# Patient Record
Sex: FEMALE | Race: WHITE | NOT HISPANIC OR LATINO | Employment: UNEMPLOYED | URBAN - METROPOLITAN AREA
[De-identification: names, ages, dates, MRNs, and addresses within clinical notes are randomized per-mention and may not be internally consistent; named-entity substitution may affect disease eponyms.]

---

## 2021-12-07 ENCOUNTER — TRANSCRIBE ORDERS (OUTPATIENT)
Dept: ADMINISTRATIVE | Facility: HOSPITAL | Age: 56
End: 2021-12-07

## 2021-12-07 DIAGNOSIS — R91.1 LUNG NODULE: Primary | ICD-10-CM

## 2021-12-08 ENCOUNTER — HOSPITAL ENCOUNTER (OUTPATIENT)
Dept: PET IMAGING | Facility: HOSPITAL | Age: 56
Discharge: HOME | End: 2021-12-08

## 2021-12-08 DIAGNOSIS — R91.1 LUNG NODULE: ICD-10-CM

## 2021-12-08 PROCEDURE — 78815 PET IMAGE W/CT SKULL-THIGH: CPT

## 2021-12-08 PROCEDURE — A9552 F18 FDG: HCPCS | Performed by: NURSE PRACTITIONER

## 2021-12-08 PROCEDURE — 0 FLUDEOXYGLUCOSE F18 SOLUTION: Performed by: NURSE PRACTITIONER

## 2021-12-08 RX ADMIN — FLUDEOXYGLUCOSE F18 1 DOSE: 300 INJECTION INTRAVENOUS at 09:45

## 2022-01-21 ENCOUNTER — TRANSCRIBE ORDERS (OUTPATIENT)
Dept: ADMINISTRATIVE | Facility: HOSPITAL | Age: 57
End: 2022-01-21

## 2022-01-21 DIAGNOSIS — E04.1 THYROID NODULE: Primary | ICD-10-CM

## 2022-02-17 ENCOUNTER — HOSPITAL ENCOUNTER (OUTPATIENT)
Dept: ULTRASOUND IMAGING | Facility: HOSPITAL | Age: 57
Discharge: HOME | End: 2022-02-17
Admitting: INTERNAL MEDICINE

## 2022-02-17 DIAGNOSIS — E04.1 THYROID NODULE: ICD-10-CM

## 2022-02-17 PROCEDURE — 76536 US EXAM OF HEAD AND NECK: CPT

## 2022-06-22 ENCOUNTER — OFFICE VISIT (OUTPATIENT)
Dept: ORTHOPEDIC SURGERY | Facility: CLINIC | Age: 57
End: 2022-06-22

## 2022-06-22 VITALS — HEIGHT: 62 IN | OXYGEN SATURATION: 97 % | WEIGHT: 186 LBS | HEART RATE: 74 BPM | BODY MASS INDEX: 34.23 KG/M2

## 2022-06-22 DIAGNOSIS — M25.562 LEFT KNEE PAIN, UNSPECIFIED CHRONICITY: Primary | ICD-10-CM

## 2022-06-22 DIAGNOSIS — M17.12 PRIMARY OSTEOARTHRITIS OF LEFT KNEE: ICD-10-CM

## 2022-06-22 PROCEDURE — 99203 OFFICE O/P NEW LOW 30 MIN: CPT | Performed by: ORTHOPAEDIC SURGERY

## 2022-06-22 PROCEDURE — 20610 DRAIN/INJ JOINT/BURSA W/O US: CPT | Performed by: ORTHOPAEDIC SURGERY

## 2022-06-22 RX ORDER — ATORVASTATIN CALCIUM 10 MG/1
10 TABLET, FILM COATED ORAL DAILY
COMMUNITY
Start: 2022-06-17

## 2022-06-22 RX ORDER — MONTELUKAST SODIUM 10 MG/1
10 TABLET ORAL DAILY
COMMUNITY
Start: 2022-05-12

## 2022-06-22 RX ORDER — CELECOXIB 100 MG/1
CAPSULE ORAL
COMMUNITY
Start: 2022-06-17

## 2022-06-22 RX ORDER — CETIRIZINE HYDROCHLORIDE 10 MG/1
TABLET ORAL
COMMUNITY
Start: 2022-05-21

## 2022-06-22 RX ORDER — LEVOTHYROXINE SODIUM 125 MCG
TABLET ORAL
COMMUNITY
Start: 2022-06-18

## 2022-06-22 RX ORDER — LISINOPRIL 10 MG/1
10 TABLET ORAL DAILY
COMMUNITY
Start: 2022-05-21

## 2022-06-22 RX ORDER — TRIAMCINOLONE ACETONIDE 40 MG/ML
40 INJECTION, SUSPENSION INTRA-ARTICULAR; INTRAMUSCULAR
Status: COMPLETED | OUTPATIENT
Start: 2022-06-22 | End: 2022-06-22

## 2022-06-22 RX ORDER — LIDOCAINE HYDROCHLORIDE 10 MG/ML
9 INJECTION, SOLUTION INFILTRATION; PERINEURAL
Status: COMPLETED | OUTPATIENT
Start: 2022-06-22 | End: 2022-06-22

## 2022-06-22 RX ORDER — MELATONIN 10 MG
CAPSULE ORAL
COMMUNITY

## 2022-06-22 RX ADMIN — LIDOCAINE HYDROCHLORIDE 9 ML: 10 INJECTION, SOLUTION INFILTRATION; PERINEURAL at 08:09

## 2022-06-22 RX ADMIN — TRIAMCINOLONE ACETONIDE 40 MG: 40 INJECTION, SUSPENSION INTRA-ARTICULAR; INTRAMUSCULAR at 08:09

## 2022-06-22 NOTE — PROGRESS NOTES
"Chief Complaint  Initial Evaluation of the Left Knee     Subjective      Dimple Castellanos presents to [facility] for an evaluation of left knee. She has had on and off pain in the left knee for several years, last month pain worsened. She hasn't had any injury or trauma to the knee. No formal treatment for the knee has been done. She is present today with MRI results of the left knee.     Allergies   Allergen Reactions   • Milk-Related Compounds Unknown - Low Severity   • Sulfa Antibiotics Unknown - Low Severity        Social History     Socioeconomic History   • Marital status: Single   Tobacco Use   • Smoking status: Never Smoker   • Smokeless tobacco: Never Used        Review of Systems     Objective   Vital Signs:   Pulse 74   Ht 157.5 cm (62\")   Wt 84.4 kg (186 lb)   SpO2 97%   BMI 34.02 kg/m²       Physical Exam  Constitutional:       Appearance: Normal appearance. Patient is well-developed and normal weight.   HENT:      Head: Normocephalic.      Right Ear: Hearing and external ear normal.      Left Ear: Hearing and external ear normal.      Nose: Nose normal.   Eyes:      Conjunctiva/sclera: Conjunctivae normal.   Cardiovascular:      Rate and Rhythm: Normal rate.   Pulmonary:      Effort: Pulmonary effort is normal.      Breath sounds: No wheezing or rales.   Abdominal:      Palpations: Abdomen is soft.      Tenderness: There is no abdominal tenderness.   Musculoskeletal:      Cervical back: Normal range of motion.   Skin:     Findings: No rash.   Neurological:      Mental Status: Patient  is alert and oriented to person, place, and time.   Psychiatric:         Mood and Affect: Mood and affect normal.         Judgment: Judgment normal.       Ortho Exam      LEFT KNEE: -5 degrees of extension. Flexion to 120 degrees. Limping gait. Good strength to hamstrings, quadriceps, dorsiflexors and plantar flexors. Stable to varus/valgus stress. Stable anterior and posterior drawer. " Swelling. Tender medial and lateral joint lines.     Large Joint Arthrocentesis: L knee  Date/Time: 6/22/2022 8:09 AM  Consent given by: patient  Site marked: site marked  Timeout: Immediately prior to procedure a time out was called to verify the correct patient, procedure, equipment, support staff and site/side marked as required   Supporting Documentation  Indications: pain   Procedure Details  Location: knee - L knee  Needle size: 22 G  Medications administered: 9 mL lidocaine 1 %; 40 mg triamcinolone acetonide 40 MG/ML  Patient tolerance: patient tolerated the procedure well with no immediate complications              Imaging Results (Most Recent)     Procedure Component Value Units Date/Time    XR Knee 3 View Left [[id number]] Resulted: 06/22/22 0745     Updated: 06/22/22 0747           Result Review :     X-Ray Report:  Left knee(s) X-Ray  Indication: Evaluation of left knee pain   AP, Lateral and Standing view(s)  Findings: Joint space narrowing, spurring present. No acute fractures or dislocation.   Prior studies available for comparison: no   ___    [facility]      5/25/22     MRI LEFT KNEE     IMPRESSION: 1. Moderately advanced medial compartment arthrosis, medial meniscus tear detailed above.     2. 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      3. Mild effusion.      4. Mucoid degeneration ACL.     5. Lateral compartment chondromalacia detailed above.      5. No acute fractures or sizable loose body.        Assessment and Plan     Diagnoses and all orders for this visit:    1. Left knee pain, unspecified chronicity (Primary)  -     XR Knee 3 View Left    2. Primary osteoarthritis of left knee        Treatment plans and diagnosis with the patient discussed. Risks and benefits of steroid injection discussed, she understands and wishes to proceed. Left knee injection tolerated well. If this fails, may consider gel injections. Continue taking  Celebrex.     Call or return if worsening symptoms.    Follow Up     4-6 weeks.       Patient was given instructions and counseling regarding her condition or for health maintenance advice. Please see specific information pulled into the AVS if appropriate.     Scribed for Pawel Zavala MD by Shruti Mcgill.  06/22/22   08:00 EDT    I have personally performed the services described in this document as scribed by the above individual and it is both accurate and complete. Pawel Zavala MD 06/22/22

## 2022-09-06 ENCOUNTER — TELEPHONE (OUTPATIENT)
Dept: ORTHOPEDIC SURGERY | Facility: CLINIC | Age: 57
End: 2022-09-06

## 2022-09-06 NOTE — TELEPHONE ENCOUNTER
Caller: Dimple Castellanos    Relationship to patient: Self    Best call back number: [phone]    Chief complaint: LEFT KNEE    Type of visit: INJECTION    Requested date: BEGINNING OF October     Additional notes: PATIENT WOULD LIKE TO SCHEDULE CORTISONE INJECTION LEFT KNEE IN BEGINNING OF October. TY

## 2022-09-07 ENCOUNTER — TELEPHONE (OUTPATIENT)
Dept: ORTHOPEDIC SURGERY | Facility: CLINIC | Age: 57
End: 2022-09-07

## 2022-09-07 NOTE — TELEPHONE ENCOUNTER
Caller: MARY ANN GRACIA    Relationship to patient: SELF    Patient is needing:   SALVADOR: RE: THE SCHEDULED INJ 09/07/2022 PATIENT CALLED BACK AFTER SCHEDULING THE INJECTION AND ADVISES NEEDS A Friday.  ATTEMPTED TO WARM TRANSFER - HUB ADVISED PATIENT WOULD NOTIFY THE FRONT AND SOMEONE WOULD HAVE TO CALL HER BACK, TO SCHEDULE

## 2022-09-14 NOTE — TELEPHONE ENCOUNTER
PATIENT CALLED BACK, SAID SHE NEEDS TO RESCHEDULE FOR TRANSPORTATION ISSUES. SHE WOULD LIKE TO HAVE THE APPT WITH DR VALDERRAMA IF POSSIBLE.

## 2022-10-21 ENCOUNTER — OFFICE VISIT (OUTPATIENT)
Dept: ORTHOPEDIC SURGERY | Facility: CLINIC | Age: 57
End: 2022-10-21

## 2022-10-21 VITALS — HEART RATE: 74 BPM | BODY MASS INDEX: 34.78 KG/M2 | WEIGHT: 189 LBS | HEIGHT: 62 IN | OXYGEN SATURATION: 97 %

## 2022-10-21 DIAGNOSIS — M17.12 PRIMARY OSTEOARTHRITIS OF LEFT KNEE: Primary | ICD-10-CM

## 2022-10-21 PROCEDURE — 20610 DRAIN/INJ JOINT/BURSA W/O US: CPT | Performed by: ORTHOPAEDIC SURGERY

## 2022-10-21 RX ORDER — TRIAMCINOLONE ACETONIDE 40 MG/ML
40 INJECTION, SUSPENSION INTRA-ARTICULAR; INTRAMUSCULAR
Status: COMPLETED | OUTPATIENT
Start: 2022-10-21 | End: 2022-10-21

## 2022-10-21 RX ORDER — QUETIAPINE FUMARATE 50 MG/1
50 TABLET, FILM COATED ORAL
COMMUNITY
Start: 2022-09-27

## 2022-10-21 RX ORDER — CARIPRAZINE 3 MG/1
3 CAPSULE, GELATIN COATED ORAL DAILY
COMMUNITY
Start: 2022-09-22

## 2022-10-21 RX ORDER — LIDOCAINE HYDROCHLORIDE 10 MG/ML
5 INJECTION, SOLUTION INFILTRATION; PERINEURAL
Status: COMPLETED | OUTPATIENT
Start: 2022-10-21 | End: 2022-10-21

## 2022-10-21 RX ORDER — PRENATAL VIT 91/IRON/FOLIC/DHA 28-975-200
COMBINATION PACKAGE (EA) ORAL
COMMUNITY
Start: 2022-10-18

## 2022-10-21 RX ADMIN — LIDOCAINE HYDROCHLORIDE 5 ML: 10 INJECTION, SOLUTION INFILTRATION; PERINEURAL at 13:14

## 2022-10-21 RX ADMIN — TRIAMCINOLONE ACETONIDE 40 MG: 40 INJECTION, SUSPENSION INTRA-ARTICULAR; INTRAMUSCULAR at 13:14

## 2022-10-21 NOTE — PROGRESS NOTES
"Chief Complaint  Follow-up of the Left Knee     Subjective      Dimple Castellanos presents to [facility] for a follow-up of left knee. Patient has a history of left knee osteoarthritis. Patient states that she has been having increasing pain in the left knee. She denies any new injuries or trauma. She had injections in the past that gave her relief.     Allergies   Allergen Reactions   • Milk-Related Compounds Unknown - Low Severity   • Sulfa Antibiotics Unknown - Low Severity        Social History     Socioeconomic History   • Marital status: Single   Tobacco Use   • Smoking status: Never   • Smokeless tobacco: Never        Review of Systems     Objective   Vital Signs:   Pulse 74   Ht 157.5 cm (62\")   Wt 85.7 kg (189 lb)   SpO2 97%   BMI 34.57 kg/m²       Physical Exam  Constitutional:       Appearance: Normal appearance. Patient is well-developed and normal weight.   HENT:      Head: Normocephalic.      Right Ear: Hearing and external ear normal.      Left Ear: Hearing and external ear normal.      Nose: Nose normal.   Eyes:      Conjunctiva/sclera: Conjunctivae normal.   Cardiovascular:      Rate and Rhythm: Normal rate.   Pulmonary:      Effort: Pulmonary effort is normal.      Breath sounds: No wheezing or rales.   Abdominal:      Palpations: Abdomen is soft.      Tenderness: There is no abdominal tenderness.   Musculoskeletal:      Cervical back: Normal range of motion.   Skin:     Findings: No rash.   Neurological:      Mental Status: Patient  is alert and oriented to person, place, and time.   Psychiatric:         Mood and Affect: Mood and affect normal.         Judgment: Judgment normal.       Ortho Exam      LEFT KNEE: Good strength to hamstrings, quadriceps, dorsiflexors and plantar flexors. Stable to varus/valgus stress. Stable anterior and posterior drawer. Negative Lachman. Dorsal Pedal Pulse 2+, posterior tibialis pulse 2+. -5 degrees of extension. Flexion to 120 " degrees. Limping gait. Tender joint lines.       Large Joint Arthrocentesis: L knee  Date/Time: 10/21/2022 1:14 PM  Consent given by: patient  Site marked: site marked  Timeout: Immediately prior to procedure a time out was called to verify the correct patient, procedure, equipment, support staff and site/side marked as required   Supporting Documentation  Indications: pain   Procedure Details  Location: knee - L knee  Needle size: 22 G  Medications administered: 5 mL lidocaine 1 %; 40 mg triamcinolone acetonide 40 MG/ML  Patient tolerance: patient tolerated the procedure well with no immediate complications              Imaging Results (Most Recent)     None           Result Review :         No results found.           Assessment and Plan     Diagnoses and all orders for this visit:    1. Primary osteoarthritis of left knee (Primary)        Left knee cortisone injection administered, patient tolerated this well.     Call or return if worsening symptoms.    Follow Up     Prn.       Patient was given instructions and counseling regarding her condition or for health maintenance advice. Please see specific information pulled into the AVS if appropriate.     Scribed for Pawel Zavala MD by Shruti Mcgill.  10/21/22   13:09 EDT    I have personally performed the services described in this document as scribed by the above individual and it is both accurate and complete. Pawel Zavala MD 10/21/22

## 2023-03-31 ENCOUNTER — TELEPHONE (OUTPATIENT)
Dept: OBSTETRICS AND GYNECOLOGY | Facility: CLINIC | Age: 58
End: 2023-03-31

## 2023-05-30 ENCOUNTER — TELEPHONE (OUTPATIENT)
Dept: ORTHOPEDIC SURGERY | Facility: CLINIC | Age: 58
End: 2023-05-30

## 2023-05-30 NOTE — TELEPHONE ENCOUNTER
Caller: MARY ANN    Relationship to patient: SELF    Best call back number: [phone]    Chief complaint: LEFT KNEE PAIN     Type of visit: INJECTION     Requested date: NEXT OPENING

## 2023-05-31 ENCOUNTER — TELEPHONE (OUTPATIENT)
Dept: ORTHOPEDIC SURGERY | Facility: CLINIC | Age: 58
End: 2023-05-31

## 2023-05-31 NOTE — TELEPHONE ENCOUNTER
Caller: MARY ANN GRACIA    Relationship to patient: SELF    Best call back number: [phone]    Chief complaint: LEFT KNEE INJECTION    Type of visit: LEFT KNEE INJECTION    Requested date: THE FOLLOWING WEEK    If rescheduling, when is the original appointment: 06/07/2023    Additional notes: NA

## 2023-06-06 ENCOUNTER — OFFICE VISIT (OUTPATIENT)
Dept: OBSTETRICS AND GYNECOLOGY | Facility: CLINIC | Age: 58
End: 2023-06-06
Payer: COMMERCIAL

## 2023-06-06 VITALS
HEIGHT: 62 IN | HEART RATE: 65 BPM | DIASTOLIC BLOOD PRESSURE: 83 MMHG | BODY MASS INDEX: 36.25 KG/M2 | SYSTOLIC BLOOD PRESSURE: 136 MMHG | WEIGHT: 197 LBS

## 2023-06-06 DIAGNOSIS — N81.4 CYSTOCELE WITH PROLAPSE: Primary | ICD-10-CM

## 2023-06-06 PROCEDURE — 99203 OFFICE O/P NEW LOW 30 MIN: CPT | Performed by: OBSTETRICS & GYNECOLOGY

## 2023-06-06 RX ORDER — TERBINAFINE HYDROCHLORIDE 250 MG/1
250 TABLET ORAL EVERY MORNING
COMMUNITY
Start: 2023-05-25

## 2023-06-06 RX ORDER — IPRATROPIUM BROMIDE AND ALBUTEROL SULFATE 2.5; .5 MG/3ML; MG/3ML
SOLUTION RESPIRATORY (INHALATION)
COMMUNITY
Start: 2023-05-08

## 2023-06-06 RX ORDER — BUDESONIDE AND FORMOTEROL FUMARATE DIHYDRATE 160; 4.5 UG/1; UG/1
2 AEROSOL RESPIRATORY (INHALATION)
COMMUNITY

## 2023-06-06 RX ORDER — POLYETHYLENE GLYCOL 3350 17 G/17G
POWDER, FOR SOLUTION ORAL
COMMUNITY
Start: 2023-05-12

## 2023-06-06 RX ORDER — LEVOTHYROXINE SODIUM 112 MCG
112 TABLET ORAL
COMMUNITY

## 2023-06-06 RX ORDER — MELATONIN
1000
COMMUNITY

## 2023-06-06 RX ORDER — MULTIVITAMIN
1 TABLET ORAL EVERY MORNING
COMMUNITY
Start: 2023-05-25

## 2023-06-06 NOTE — PROGRESS NOTES
"GYN Visit    Chief Complaint   Patient presents with    Establish Care     Possible bladder prolapse       HPI:   57 y.o. LMP: No LMP recorded. Patient is postmenopausal. Here today for referral for bladder prolapse. Patient states she has started to notice increase pressure in her pelvis like something is trying to fall out. She also has been having some incontinence issue where she will sometimes just stand up and urinate without warning. She denies any incontinence with coughing, laughing or sneezing. She denies any bowel issues or need to split. She denies any vaginal odor, discharge or irritation. She is not sexually active. On examination she was noted to have a grade 2 without valsalva and a grade 3 with valsalva, negative coughing test. She was counseled on options inducing lifestyle modifications verses pessary verses surgical. At this time she is wishing to try lifestyle and a possible pessary. She was counseled that we will bring her in for a pessary fitting. She denies any other complaints today, she denies any dysuria or hematuria.     Social History     Substance and Sexual Activity   Sexual Activity Not Currently    Birth control/protection: Post-menopausal       History: PMHx, Meds, Allergies, PSHx, Social Hx, and POBHx all reviewed and updated.    PHYSICAL EXAM:  /83   Pulse 65   Ht 157.5 cm (62\")   Wt 89.4 kg (197 lb)   BMI 36.03 kg/m²   General- NAD, alert and oriented, appropriate  Psych- Normal mood, good memory    Neck- No masses, no thyroid enlargement  Cardiovascular- Regular rhythm, no murnurs  Respiratory- CTA to bases, no wheezes  Abdomen- Soft, non distended, non tender, no masses    Breast left-  Bilaterally symmetrical, no masses, non tender, no nipple discharge  Breast right- Bilaterally symmetrical, no masses, non tender, no nipple discharge    External genitalia- Normal female, no lesions  Urethra/meatus- Normal, no masses, non tender, no prolapse  Bladder- Normal, " no masses, non tender, grade 2 cystocele w/o valsalva, grade  with valsalva.  Vagina- Normal, no atrophy, no lesions, no discharge, no prolapse  Cervix- Normal, no lesions, no discharge, No cervical motion tenderness  Uterus- Normal size, shape & consistency.  Non tender, mobile, & no prolapse  Adnexa- No mass, non tender  Anus/Rectum/Perineum- Declined        ASSESSMENT AND PLAN:  Diagnoses and all orders for this visit:    1. Cystocele with prolapse (Primary)        Follow Up:  Return in about 4 weeks (around 7/4/2023) for pessary fitiing .    I spent 20 minutes caring for Dimple on this date of service. This time includes time spent by me in the following activities:preparing for the visit, reviewing tests, obtaining and/or reviewing a separately obtained history, performing a medically appropriate examination and/or evaluation , and counseling and educating the patient/family/caregiver    Kati Gallardo DO  06/06/2023    [facility]  [facility] [place] [address]  Dept: [phone]  Dept Fax: [phone]  Loc: [phone]  Loc Fax: [phone]

## 2023-06-07 ENCOUNTER — OFFICE VISIT (OUTPATIENT)
Dept: ORTHOPEDIC SURGERY | Facility: CLINIC | Age: 58
End: 2023-06-07
Payer: COMMERCIAL

## 2023-06-07 VITALS
DIASTOLIC BLOOD PRESSURE: 84 MMHG | OXYGEN SATURATION: 96 % | HEIGHT: 62 IN | WEIGHT: 201.4 LBS | BODY MASS INDEX: 37.06 KG/M2 | HEART RATE: 59 BPM | SYSTOLIC BLOOD PRESSURE: 159 MMHG

## 2023-06-07 DIAGNOSIS — M25.562 LEFT KNEE PAIN, UNSPECIFIED CHRONICITY: Primary | ICD-10-CM

## 2023-06-07 DIAGNOSIS — M17.12 PRIMARY OSTEOARTHRITIS OF LEFT KNEE: ICD-10-CM

## 2023-06-07 RX ORDER — TRIAMCINOLONE ACETONIDE 40 MG/ML
40 INJECTION, SUSPENSION INTRA-ARTICULAR; INTRAMUSCULAR
Status: COMPLETED | OUTPATIENT
Start: 2023-06-07 | End: 2023-06-07

## 2023-06-07 RX ORDER — LIDOCAINE HYDROCHLORIDE 10 MG/ML
5 INJECTION, SOLUTION INFILTRATION; PERINEURAL
Status: COMPLETED | OUTPATIENT
Start: 2023-06-07 | End: 2023-06-07

## 2023-06-07 RX ADMIN — LIDOCAINE HYDROCHLORIDE 5 ML: 10 INJECTION, SOLUTION INFILTRATION; PERINEURAL at 10:31

## 2023-06-07 RX ADMIN — TRIAMCINOLONE ACETONIDE 40 MG: 40 INJECTION, SUSPENSION INTRA-ARTICULAR; INTRAMUSCULAR at 10:31

## 2023-06-07 NOTE — PROGRESS NOTES
"Chief Complaint  Follow-up of the Left Knee     Subjective      Dimple Castellanos presents to [facility] for follow up of the left knee. She has had osteo arthritis and treated it conservatively for years.  She gets injections to help with pain.  She has difficulty with prolonged standing and ambulation.  She has had no recent injury.     Allergies   Allergen Reactions    Beef Allergy GI Intolerance    Milk-Related Compounds Unknown - Low Severity    Pork-Derived Products GI Intolerance    Sulfa Antibiotics Unknown - Low Severity        Social History     Socioeconomic History    Marital status: Single   Tobacco Use    Smoking status: Never    Smokeless tobacco: Never   Vaping Use    Vaping Use: Never used   Substance and Sexual Activity    Alcohol use: Never    Drug use: Never    Sexual activity: Not Currently     Birth control/protection: Post-menopausal        Review of Systems     Objective   Vital Signs:   /84 (BP Location: Left arm, Patient Position: Sitting, Cuff Size: Adult)   Pulse 59   Ht 157.5 cm (62\")   Wt 91.4 kg (201 lb 6.4 oz)   SpO2 96%   BMI 36.84 kg/m²       Physical Exam  Constitutional:       Appearance: Normal appearance. Patient is well-developed and normal weight.   HENT:      Head: Normocephalic.      Right Ear: Hearing and external ear normal.      Left Ear: Hearing and external ear normal.      Nose: Nose normal.   Eyes:      Conjunctiva/sclera: Conjunctivae normal.   Cardiovascular:      Rate and Rhythm: Normal rate.   Pulmonary:      Effort: Pulmonary effort is normal.      Breath sounds: No wheezing or rales.   Abdominal:      Palpations: Abdomen is soft.      Tenderness: There is no abdominal tenderness.   Musculoskeletal:      Cervical back: Normal range of motion.   Skin:     Findings: No rash.   Neurological:      Mental Status: Patient  is alert and oriented to person, place, and time.   Psychiatric:         Mood and Affect: Mood and affect " normal.         Judgment: Judgment normal.       Ortho Exam      LEFT KNEE Flexion 115. Extension 0. Stable to varus/valgus stress. Stable to anterior/posterior drawer. Neurovascularly intact. Negative Tyra. Negative Lachman. Positive EHL, FHL, HS and TA. Sensation intact to light touch all 5 nerves of the foot. Ambulates with Antalgic gait. Patella is well tracking. Calf supple, non-tender. Positive tenderness to the medial joint line. Positive tenderness to the lateral joint line. Positive Crepitus. Good strength to hamstrings, quadriceps, dorsiflexors, and plantar flexors.  Knee Extensor Mechanism intact      Left knee: L knee  Date/Time: 6/7/2023 10:31 AM  Consent given by: patient  Site marked: site marked  Timeout: Immediately prior to procedure a time out was called to verify the correct patient, procedure, equipment, support staff and site/side marked as required   Supporting Documentation  Indications: pain   Procedure Details  Location: knee - L knee  Needle size: 22 G  Medications administered: 40 mg triamcinolone acetonide 40 MG/ML; 5 mL lidocaine 1 %  Patient tolerance: patient tolerated the procedure well with no immediate complications            Imaging Results (Most Recent)       None             Result Review :               Assessment and Plan     Diagnoses and all orders for this visit:    1. Left knee pain, unspecified chronicity (Primary)  -     Left knee: L knee    2. Primary osteoarthritis of left knee        Discussed the treatment plan with the patient. I reviewed the X-rays that were obtained today with the patient.     Discussed the risks and benefits of conservative measures.  The patient expressed understanding and wished to proceed with a left knee steroid injection.  She tolerated the injection well.     Call or return if worsening symptoms.    Follow Up     PRN      Patient was given instructions and counseling regarding her condition or for health maintenance advice. Please see  specific information pulled into the AVS if appropriate.     Scribed for Pawel Zavala MD by Hayley Machuca MA.  06/07/23   10:28 EDT      I have personally performed the services described in this document as scribed by the above individual and it is both accurate and complete. Pawel Zavala MD 06/07/23

## 2023-08-23 ENCOUNTER — TELEPHONE (OUTPATIENT)
Dept: ORTHOPEDIC SURGERY | Facility: CLINIC | Age: 58
End: 2023-08-23
Payer: COMMERCIAL

## 2023-08-23 NOTE — TELEPHONE ENCOUNTER
Provider: DR VALDERRAMA   Caller: MARY ANN GRACIA   Phone Number: [phone]  Reason for Call: PATIENT WANTS TO GET ANOTHER LEFT KNEE INJECTION SCHEDULED. SHE CAN ONLY DO FRIDAYS. LAST ONE WAS 6/7/23. PLEASE ADVISE.

## 2023-09-08 ENCOUNTER — OFFICE VISIT (OUTPATIENT)
Dept: ORTHOPEDIC SURGERY | Facility: CLINIC | Age: 58
End: 2023-09-08
Payer: COMMERCIAL

## 2023-09-08 VITALS — HEIGHT: 62 IN | BODY MASS INDEX: 35.7 KG/M2 | WEIGHT: 194 LBS

## 2023-09-08 DIAGNOSIS — M17.12 PRIMARY OSTEOARTHRITIS OF LEFT KNEE: Primary | ICD-10-CM

## 2023-09-08 DIAGNOSIS — M25.562 LEFT KNEE PAIN, UNSPECIFIED CHRONICITY: ICD-10-CM

## 2023-09-08 RX ORDER — LIDOCAINE HYDROCHLORIDE 10 MG/ML
5 INJECTION, SOLUTION INFILTRATION; PERINEURAL
Status: COMPLETED | OUTPATIENT
Start: 2023-09-08 | End: 2023-09-08

## 2023-09-08 RX ORDER — TRIAMCINOLONE ACETONIDE 40 MG/ML
40 INJECTION, SUSPENSION INTRA-ARTICULAR; INTRAMUSCULAR
Status: COMPLETED | OUTPATIENT
Start: 2023-09-08 | End: 2023-09-08

## 2023-09-08 RX ADMIN — TRIAMCINOLONE ACETONIDE 40 MG: 40 INJECTION, SUSPENSION INTRA-ARTICULAR; INTRAMUSCULAR at 15:01

## 2023-09-08 RX ADMIN — LIDOCAINE HYDROCHLORIDE 5 ML: 10 INJECTION, SOLUTION INFILTRATION; PERINEURAL at 15:01

## 2023-09-08 NOTE — PROGRESS NOTES
"Chief Complaint  Follow-up of the Left Knee     Subjective      Dimple Castellanos presents to [facility] for follow up of the left knee.  She had an injection 6/7/23.  She noted that was helpful for three months. She has had osteo arthritis and treated it conservatively for years.  She has difficulty with prolonged standing and ambulation.  She has had no recent injury.     Allergies   Allergen Reactions    Beef Allergy GI Intolerance    Milk-Related Compounds Unknown - Low Severity    Pork-Derived Products GI Intolerance    Sulfa Antibiotics Unknown - Low Severity        Social History     Socioeconomic History    Marital status: Single   Tobacco Use    Smoking status: Never    Smokeless tobacco: Never   Vaping Use    Vaping Use: Never used   Substance and Sexual Activity    Alcohol use: Never    Drug use: Never    Sexual activity: Not Currently     Birth control/protection: Post-menopausal        I reviewed the patient's chief complaint, history of present illness, review of systems, past medical history, surgical history, family history, social history, medications, and allergy list.     Review of Systems     Constitutional: Denies fevers, chills, weight loss  Cardiovascular: Denies chest pain, shortness of breath  Skin: Denies rashes, acute skin changes  Neurologic: Denies headache, loss of consciousness        Vital Signs:   Ht 157.5 cm (62\")   Wt 88 kg (194 lb)   BMI 35.48 kg/m²          Physical Exam  General: Alert. No acute distress    Ortho Exam        LEFT KNEE Flexion 120. Extension 0. Stable to varus/valgus stress. Stable to anterior/posterior drawer. Neurovascularly intact. Negative Tyra. Negative Lachman. Positive EHL, FHL, HS and TA. Sensation intact to light touch all 5 nerves of the foot. Ambulates with Antalgic gait. Patella is well tracking. Calf supple, non-tender. Positive tenderness to the medial joint line. Positive tenderness to the lateral joint line. " Positive Crepitus. Good strength to hamstrings, quadriceps, dorsiflexors, and plantar flexors.  Knee Extensor Mechanism intact       Left knee: L knee  Date/Time: 9/8/2023 3:01 PM  Consent given by: patient  Site marked: site marked  Timeout: Immediately prior to procedure a time out was called to verify the correct patient, procedure, equipment, support staff and site/side marked as required   Supporting Documentation  Indications: pain   Procedure Details  Location: knee - L knee  Needle size: 22 G  Medications administered: 5 mL lidocaine 1 %; 40 mg triamcinolone acetonide 40 MG/ML  Patient tolerance: patient tolerated the procedure well with no immediate complications            Imaging Results (Most Recent)       None             Result Review :           Assessment and Plan     Diagnoses and all orders for this visit:    1. Primary osteoarthritis of left knee (Primary)    2. Left knee pain, unspecified chronicity        Discussed the treatment plan with the patient.     Discussed the risks and benefits of conservative measures.  The patient expressed understanding and wished to proceed with a left knee steroid injeciton.  She tolerated the injeciton well.         Call or return if worsening symptoms.    Follow Up     PRN      Patient was given instructions and counseling regarding her condition or for health maintenance advice. Please see specific information pulled into the AVS if appropriate.     Scribed for Pawel Zavala MD by Hayley Machuca MA.  09/08/23   14:40 EDT    I have personally performed the services described in this document as scribed by the above individual and it is both accurate and complete. Pawel Zavala MD 09/08/23

## 2023-10-12 ENCOUNTER — OFFICE VISIT (OUTPATIENT)
Dept: OBSTETRICS AND GYNECOLOGY | Facility: CLINIC | Age: 58
End: 2023-10-12
Payer: COMMERCIAL

## 2023-10-12 VITALS
SYSTOLIC BLOOD PRESSURE: 112 MMHG | BODY MASS INDEX: 34.02 KG/M2 | HEART RATE: 79 BPM | DIASTOLIC BLOOD PRESSURE: 74 MMHG | WEIGHT: 186 LBS

## 2023-10-12 DIAGNOSIS — N89.8 VAGINAL DISCHARGE: ICD-10-CM

## 2023-10-12 DIAGNOSIS — N81.4 CYSTOCELE WITH PROLAPSE: Primary | ICD-10-CM

## 2023-10-12 LAB
CANDIDA SPECIES: NEGATIVE
GARDNERELLA VAGINALIS: NEGATIVE
T VAGINALIS DNA VAG QL PROBE+SIG AMP: NEGATIVE

## 2023-10-12 PROCEDURE — 87660 TRICHOMONAS VAGIN DIR PROBE: CPT | Performed by: OBSTETRICS & GYNECOLOGY

## 2023-10-12 PROCEDURE — 87480 CANDIDA DNA DIR PROBE: CPT | Performed by: OBSTETRICS & GYNECOLOGY

## 2023-10-12 PROCEDURE — 87510 GARDNER VAG DNA DIR PROBE: CPT | Performed by: OBSTETRICS & GYNECOLOGY

## 2023-10-12 NOTE — PROGRESS NOTES
Pessary Follow Up Visit        Chief Complaint   Patient presents with    Pessary Check     Pessary type and size: #3 ring pessary   Local lidocaine jelly placed:  no    HPI:  Pain:  no  Vaginal bleeding:  no  Problems voiding: no  Vaginal discharge/odor:  yes  Any concerns: no    PHYSICAL EXAM:  /74   Pulse 79   Wt 84.4 kg (186 lb)   Breastfeeding No   BMI 34.02 kg/mý   External genitalia- Normal, no lesions  Pessary- removed, cleaned and replaced  Patient tolerated the procedure well.  Vagina- Normal, no discharge, no bleeding  Bladder- Normal, no masses, non tender  Cvx- Normal, no lesions, no discharge, No cervical motion tenderness    ASSESSMENT AND PLAN:   Diagnoses and all orders for this visit:    1. Cystocele with prolapse (Primary)    2. Vaginal discharge  -     Gardnerella vaginalis, Trichomonas vaginalis, Candida albicans, DNA - Swab, Vagina        Counseling:  FU for inability to void, pain, vaginal bleeding, odor, or any concerns  Continue vaginal estrogen and/or trimo-dumont jelly as previously prescribed.    Continue vaginal estrogen and/or trimo-dumont jelly as previously prescribed.    Follow Up:  Return in about 3 months (around 1/12/2024) for pessary maintenance .    I spent 2 minutes on the separately reported service of pessary maintenance. This time is not included in the time used to support the E/M service also reported today.      Kati Gallardo DO  10/12/2023    [facility] [place] [facility]  [address] [place] [address]  Dept: [phone]  Dept Fax: [phone]  Loc: [phone]  Loc Fax: [phone]

## 2023-10-19 ENCOUNTER — TRANSCRIBE ORDERS (OUTPATIENT)
Dept: ADMINISTRATIVE | Facility: HOSPITAL | Age: 58
End: 2023-10-19
Payer: COMMERCIAL

## 2023-10-19 DIAGNOSIS — Z13.820 SPECIAL SCREENING FOR OSTEOPOROSIS: Primary | ICD-10-CM

## 2023-11-06 ENCOUNTER — TELEPHONE (OUTPATIENT)
Dept: ORTHOPEDIC SURGERY | Facility: CLINIC | Age: 58
End: 2023-11-06
Payer: COMMERCIAL

## 2023-11-06 NOTE — TELEPHONE ENCOUNTER
PLEASE CALL / LEAVE VMAIL TO DISCUSS INSURANCE PRIOR AUTH PROCESS & SCHEDULING FOR     FOLLOW UP / LEFT KNEE / NO NEW INJURY / XR 06-22-23 / STEROID INJECTION 09/08/23 - PATIENT WANTS TO GET GEL INJECTION     THANKS

## 2023-11-10 ENCOUNTER — TELEPHONE (OUTPATIENT)
Dept: ORTHOPEDIC SURGERY | Facility: CLINIC | Age: 58
End: 2023-11-10

## 2023-11-10 ENCOUNTER — TELEPHONE (OUTPATIENT)
Dept: ORTHOPEDIC SURGERY | Facility: CLINIC | Age: 58
End: 2023-11-10
Payer: COMMERCIAL

## 2023-11-10 NOTE — TELEPHONE ENCOUNTER
----- Message from Flores Prieto sent at 11/9/2023 11:18 AM EST -----  Patient has been approved for Left Knee Euflexxa for dates:  11/18/23 to 05/13/24.  Auth#:  [id number].  Okay to schedule when appropriate.

## 2023-11-10 NOTE — TELEPHONE ENCOUNTER
SENT MSG THRU MY CHART:  11-10-23    YOUR APPT WILL BE ON:   DATE:   11-20-23  TIME:   3:30 PM  REASON:  LT KNEE EUFLEXXA #1 INJECTION  PROVIDER:  MARYURI MASTERS  ADDRESS:  [address], [place]    DATE:   11-27-23  TIME:   3:30 PM  REASON:  LT KNEE EUFLEXXA #2 INJECTION  PROVIDER:  MARYURI MASTERS  ADDRESS:  [address], [place]    DATE:   12-4-23  TIME:   3:30 PM  REASON:  LT KNEE EUFLEXXA #3 INJECTION  PROVIDER:  MARYURI MASTERS  ADDRESS:  [address], [place]    LAST LT KNEE STEROID INJ:  9-8-23    IF YOU HAVE ANY QUESTIONS REGARDING YOUR APPOINTMENTS, PLEASE CALL US [phone].  THANKS

## 2023-11-10 NOTE — TELEPHONE ENCOUNTER
Provider: LAVELLE    Caller: MARY ANN    Relationship to Patient: SELF    Pharmacy:     Phone Number: [phone]    Reason for Call: PT CALLING TO HAVE HER GEL INJS R/S WITH DR VALDERRAMA INSTEAD OF PA. ATTEMPTED TO WT.

## 2023-11-20 ENCOUNTER — OFFICE VISIT (OUTPATIENT)
Dept: ORTHOPEDIC SURGERY | Facility: CLINIC | Age: 58
End: 2023-11-20
Payer: COMMERCIAL

## 2023-11-20 VITALS — HEIGHT: 62 IN | BODY MASS INDEX: 34.23 KG/M2 | WEIGHT: 186 LBS

## 2023-11-20 DIAGNOSIS — M17.12 OSTEOARTHRITIS OF LEFT KNEE, UNSPECIFIED OSTEOARTHRITIS TYPE: ICD-10-CM

## 2023-11-20 DIAGNOSIS — M25.562 LEFT KNEE PAIN, UNSPECIFIED CHRONICITY: Primary | ICD-10-CM

## 2023-11-20 PROCEDURE — 20610 DRAIN/INJ JOINT/BURSA W/O US: CPT | Performed by: ORTHOPAEDIC SURGERY

## 2023-11-20 RX ORDER — HYALURONATE SODIUM 10 MG/ML
20 SYRINGE (ML) INTRAARTICULAR
Status: COMPLETED | OUTPATIENT
Start: 2023-11-20 | End: 2023-11-20

## 2023-11-20 RX ADMIN — Medication 20 MG: at 15:33

## 2023-11-20 NOTE — PROGRESS NOTES
"Chief Complaint  Follow-up of the Left Knee     Subjective      Dimple Castellanos presents to [facility] for follow up of the left knee.  She has had osteo arthritis and treated it conservatively for years.  She has difficulty with prolonged standing and ambulation.  She has had no recent injury.  She is here today for Euflexxa #1 injection of the left knee.     Allergies   Allergen Reactions    Beef Allergy GI Intolerance    Milk-Related Compounds Unknown - Low Severity    Pork-Derived Products GI Intolerance    Sulfa Antibiotics Unknown - Low Severity        Social History     Socioeconomic History    Marital status: Single   Tobacco Use    Smoking status: Former     Packs/day: 2.00     Years: 15.00     Additional pack years: 0.00     Total pack years: 30.00     Types: Cigarettes     Passive exposure: Never    Smokeless tobacco: Never   Vaping Use    Vaping Use: Never used   Substance and Sexual Activity    Alcohol use: Never    Drug use: Never    Sexual activity: Not Currently     Partners: Male     Birth control/protection: Post-menopausal, Same-sex partner        I reviewed the patient's chief complaint, history of present illness, review of systems, past medical history, surgical history, family history, social history, medications, and allergy list.     Review of Systems     Constitutional: Denies fevers, chills, weight loss  Cardiovascular: Denies chest pain, shortness of breath  Skin: Denies rashes, acute skin changes  Neurologic: Denies headache, loss of consciousness        Vital Signs:   Ht 157.5 cm (62\")   Wt 84.4 kg (186 lb)   BMI 34.02 kg/m²          Physical Exam  General: Alert. No acute distress    Ortho Exam        :LEFT KNEE  Stable to anterior/posterior drawer. Neurovascularly intact. Positive EHL, FHL, HS and TA. Sensation intact to light touch all 5 nerves of the foot. Patella is well tracking. Calf supple, non-tender. Good strength to hamstrings, quadriceps, " dorsiflexors, and plantar flexors.  Knee Extensor Mechanism intact      Large Joint Arthrocentesis: L knee  Date/Time: 11/20/2023 3:33 PM  Consent given by: patient  Site marked: site marked  Timeout: Immediately prior to procedure a time out was called to verify the correct patient, procedure, equipment, support staff and site/side marked as required   Supporting Documentation  Indications: pain   Procedure Details  Location: knee - L knee  Needle size: 22 G  Medications administered: 20 mg Sodium Hyaluronate (Viscosup) 20 MG/2ML  Patient tolerance: patient tolerated the procedure well with no immediate complications          Imaging Results (Most Recent)       None             Result Review :           Assessment and Plan     Diagnoses and all orders for this visit:    1. Left knee pain, unspecified chronicity (Primary)    2. Osteoarthritis of left knee, unspecified osteoarthritis type        Discussed the treatment plan with the patient.     Left knee Euflexxa injection #1 administered in office today.  Follow-up in office in 1 week for next injection. .  Consider steroid injection after 6 weeks, if needed.  Call with changes or concerns.       Call or return if worsening symptoms.    Follow Up     1 week for Euflexxa # 2      Patient was given instructions and counseling regarding her condition or for health maintenance advice. Please see specific information pulled into the AVS if appropriate.     Scribed for Pawel Zavala MD by Hayley Machuca MA.  11/20/23   15:15 EST      I have personally performed the services described in this document as scribed by the above individual and it is both accurate and complete. Pawel Zavala MD 11/20/23

## 2023-11-27 ENCOUNTER — OFFICE VISIT (OUTPATIENT)
Dept: ORTHOPEDIC SURGERY | Facility: CLINIC | Age: 58
End: 2023-11-27
Payer: COMMERCIAL

## 2023-11-27 VITALS — BODY MASS INDEX: 34.23 KG/M2 | HEIGHT: 62 IN | WEIGHT: 186 LBS

## 2023-11-27 DIAGNOSIS — M17.12 OSTEOARTHRITIS OF LEFT KNEE, UNSPECIFIED OSTEOARTHRITIS TYPE: Primary | ICD-10-CM

## 2023-11-27 DIAGNOSIS — M17.12 PRIMARY OSTEOARTHRITIS OF LEFT KNEE: ICD-10-CM

## 2023-11-27 RX ADMIN — Medication 20 MG: at 15:02

## 2023-11-27 NOTE — PROGRESS NOTES
"Chief Complaint  Follow-up of the Left Knee     Subjective      Dimple Castellanos presents to [facility] for follow up of the left knee.  She has had osteo arthritis and treated it conservatively for years.  She has difficulty with prolonged standing and ambulation.  She has had no recent injury.  She is here today for Euflexxa #2 injection of the left knee.      Allergies   Allergen Reactions    Beef Allergy GI Intolerance    Milk-Related Compounds Unknown - Low Severity    Pork-Derived Products GI Intolerance    Sulfa Antibiotics Unknown - Low Severity        Social History     Socioeconomic History    Marital status: Single   Tobacco Use    Smoking status: Former     Packs/day: 2.00     Years: 15.00     Additional pack years: 0.00     Total pack years: 30.00     Types: Cigarettes     Passive exposure: Never    Smokeless tobacco: Never   Vaping Use    Vaping Use: Never used   Substance and Sexual Activity    Alcohol use: Never    Drug use: Never    Sexual activity: Not Currently     Partners: Male     Birth control/protection: Post-menopausal, Same-sex partner        I reviewed the patient's chief complaint, history of present illness, review of systems, past medical history, surgical history, family history, social history, medications, and allergy list.     Review of Systems     Constitutional: Denies fevers, chills, weight loss  Cardiovascular: Denies chest pain, shortness of breath  Skin: Denies rashes, acute skin changes  Neurologic: Denies headache, loss of consciousness        Vital Signs:   Ht 157.5 cm (62\")   Wt 84.4 kg (186 lb)   BMI 34.02 kg/m²          Physical Exam  General: Alert. No acute distress    Ortho Exam        LEFT KNEE Stable to anterior/posterior drawer. Neurovascularly intact. Positive EHL, FHL, HS and TA. Sensation intact to light touch all 5 nerves of the foot. Patella is well tracking. Calf supple, non-tender. Good strength to hamstrings, quadriceps, " dorsiflexors, and plantar flexors.  Knee Extensor Mechanism intact         Large Joint Arthrocentesis: L knee  Date/Time: 11/27/2023 3:02 PM  Consent given by: patient  Site marked: site marked  Timeout: Immediately prior to procedure a time out was called to verify the correct patient, procedure, equipment, support staff and site/side marked as required   Supporting Documentation  Indications: pain   Procedure Details  Location: knee - L knee  Needle size: 22 G  Medications administered: 20 mg Sodium Hyaluronate (Viscosup) 20 MG/2ML  Patient tolerance: patient tolerated the procedure well with no immediate complications            Imaging Results (Most Recent)       None             Result Review :     Assessment and Plan     Diagnoses and all orders for this visit:    1. Osteoarthritis of left knee, unspecified osteoarthritis type (Primary)    2. Primary osteoarthritis of left knee  -     Large Joint Arthrocentesis: L knee          Discussed the treatment plan with the patient.      Left knee Euflexxa injection #2 administered in office today.  Follow-up in office in 1 week for next injection. .  Consider steroid injection after 6 weeks, if needed.  Call with changes or concerns.       Call or return if worsening symptoms.    Follow Up     1 week for Euflexxa #3 injection of the left knee.       Patient was given instructions and counseling regarding her condition or for health maintenance advice. Please see specific information pulled into the AVS if appropriate.     Scribed for Pawel Zavala MD by Hayley Machuca MA.  11/27/23   14:56 EST    I have personally performed the services described in this document as scribed by the above individual and it is both accurate and complete. Pawel Zavala MD 11/28/23

## 2023-11-28 RX ORDER — HYALURONATE SODIUM 10 MG/ML
20 SYRINGE (ML) INTRAARTICULAR
Status: COMPLETED | OUTPATIENT
Start: 2023-11-27 | End: 2023-11-27

## 2023-12-04 ENCOUNTER — OFFICE VISIT (OUTPATIENT)
Dept: ORTHOPEDIC SURGERY | Facility: CLINIC | Age: 58
End: 2023-12-04
Payer: COMMERCIAL

## 2023-12-04 VITALS
OXYGEN SATURATION: 95 % | HEART RATE: 76 BPM | SYSTOLIC BLOOD PRESSURE: 132 MMHG | WEIGHT: 186 LBS | HEIGHT: 62 IN | BODY MASS INDEX: 34.23 KG/M2 | DIASTOLIC BLOOD PRESSURE: 82 MMHG

## 2023-12-04 DIAGNOSIS — M25.562 LEFT KNEE PAIN, UNSPECIFIED CHRONICITY: ICD-10-CM

## 2023-12-04 DIAGNOSIS — M17.12 OSTEOARTHRITIS OF LEFT KNEE, UNSPECIFIED OSTEOARTHRITIS TYPE: Primary | ICD-10-CM

## 2023-12-04 RX ADMIN — Medication 20 MG: at 15:42

## 2023-12-04 NOTE — PROGRESS NOTES
"Chief Complaint  Follow-up of the Left Knee     Subjective      Dimple Castellanos presents to [facility] for follow up of the left knee.   She has had osteo arthritis and treated it conservatively for years.  She has difficulty with prolonged standing and ambulation.  She has had no recent injury.  She is here today for Euflexxa #3 injection of the left knee.       Allergies   Allergen Reactions    Beef Allergy GI Intolerance    Milk-Related Compounds Unknown - Low Severity    Pork-Derived Products GI Intolerance    Sulfa Antibiotics Unknown - Low Severity        Social History     Socioeconomic History    Marital status: Single   Tobacco Use    Smoking status: Former     Packs/day: 2.00     Years: 15.00     Additional pack years: 0.00     Total pack years: 30.00     Types: Cigarettes     Passive exposure: Never    Smokeless tobacco: Never   Vaping Use    Vaping Use: Never used   Substance and Sexual Activity    Alcohol use: Never    Drug use: Never    Sexual activity: Not Currently     Partners: Male     Birth control/protection: Post-menopausal, Same-sex partner        I reviewed the patient's chief complaint, history of present illness, review of systems, past medical history, surgical history, family history, social history, medications, and allergy list.     Review of Systems     Constitutional: Denies fevers, chills, weight loss  Cardiovascular: Denies chest pain, shortness of breath  Skin: Denies rashes, acute skin changes  Neurologic: Denies headache, loss of consciousness      Vital Signs:   /82 (BP Location: Left arm, Patient Position: Sitting, Cuff Size: Adult)   Pulse 76   Ht 157.5 cm (62\")   Wt 84.4 kg (186 lb)   SpO2 95%   BMI 34.02 kg/m²          Physical Exam  General: Alert. No acute distress    Ortho Exam        LEFT KNEE Stable to anterior/posterior drawer. Neurovascularly intact. Positive EHL, FHL, HS and TA. Sensation intact to light touch all 5 nerves of " the foot. Patella is well tracking. Calf supple, non-tender. Good strength to hamstrings, quadriceps, dorsiflexors, and plantar flexors.  Knee Extensor Mechanism intact       Large Joint Arthrocentesis: L knee  Date/Time: 12/4/2023 3:42 PM  Consent given by: patient  Site marked: site marked  Timeout: Immediately prior to procedure a time out was called to verify the correct patient, procedure, equipment, support staff and site/side marked as required   Supporting Documentation  Indications: pain   Procedure Details  Location: knee - L knee  Preparation: Patient was prepped and draped in the usual sterile fashion  Needle size: 22 G  Medications administered: 20 mg Sodium Hyaluronate (Viscosup) 20 MG/2ML  Patient tolerance: patient tolerated the procedure well with no immediate complications          Imaging Results (Most Recent)       None             Result Review :             Assessment and Plan     Diagnoses and all orders for this visit:    1. Osteoarthritis of left knee, unspecified osteoarthritis type (Primary)    2. Left knee pain, unspecified chronicity        Discussed the treatment plan with the patient.      Left knee Euflexxa injection #3 administered in office today.   Consider steroid injection after 6 weeks, if needed.  Call with changes or concerns.       Call or return if worsening symptoms.    Follow Up     PRN      Patient was given instructions and counseling regarding her condition or for health maintenance advice. Please see specific information pulled into the AVS if appropriate.     Scribed for Pawel Zavala MD by Hayley Machuca MA.  12/04/23   15:40 EST    I have personally performed the services described in this document as scribed by the above individual and it is both accurate and complete. Pawel Zavala MD 12/05/23

## 2023-12-05 RX ORDER — HYALURONATE SODIUM 10 MG/ML
20 SYRINGE (ML) INTRAARTICULAR
Status: COMPLETED | OUTPATIENT
Start: 2023-12-04 | End: 2023-12-04

## 2023-12-08 ENCOUNTER — HOSPITAL ENCOUNTER (OUTPATIENT)
Dept: BONE DENSITY | Facility: HOSPITAL | Age: 58
Discharge: HOME | End: 2023-12-08
Admitting: NURSE PRACTITIONER
Payer: COMMERCIAL

## 2023-12-08 DIAGNOSIS — Z13.820 SPECIAL SCREENING FOR OSTEOPOROSIS: ICD-10-CM

## 2023-12-08 PROCEDURE — 77080 DXA BONE DENSITY AXIAL: CPT

## 2024-02-22 ENCOUNTER — OFFICE VISIT (OUTPATIENT)
Dept: OBSTETRICS AND GYNECOLOGY | Facility: CLINIC | Age: 59
End: 2024-02-22
Payer: COMMERCIAL

## 2024-02-22 VITALS
SYSTOLIC BLOOD PRESSURE: 127 MMHG | WEIGHT: 186 LBS | HEIGHT: 62 IN | BODY MASS INDEX: 34.23 KG/M2 | HEART RATE: 74 BPM | DIASTOLIC BLOOD PRESSURE: 84 MMHG

## 2024-02-22 DIAGNOSIS — N81.4 CYSTOCELE WITH PROLAPSE: Primary | ICD-10-CM

## 2024-02-22 RX ORDER — ITRACONAZOLE 100 MG/1
CAPSULE ORAL
COMMUNITY
Start: 2024-01-26

## 2024-02-22 RX ORDER — CALCIPOTRIENE 50 UG/G
OINTMENT TOPICAL
COMMUNITY
Start: 2023-12-14

## 2024-02-22 NOTE — PROGRESS NOTES
"Pessary Follow Up Visit        Chief Complaint   Patient presents with    Pessary Check     Pessary type and size: #3 ring pessary   Local lidocaine jelly placed:  no    HPI:  Pain:  no  Vaginal bleeding:  no  Problems voiding: no  Vaginal discharge/odor:  yes  Any concerns: no    PHYSICAL EXAM:  /84   Pulse 74   Ht 157.5 cm (62\")   Wt 84.4 kg (186 lb)   BMI 34.02 kg/m²   External genitalia- Normal, no lesions  Pessary- removed, cleaned and replaced  Patient tolerated the procedure well.  Vagina- Normal, no discharge, no bleeding  Bladder- Normal, no masses, non tender  Cvx- Normal, no lesions, no discharge, No cervical motion tenderness    ASSESSMENT AND PLAN:   Diagnoses and all orders for this visit:    1. Cystocele with prolapse (Primary)        Counseling:  FU for inability to void, pain, vaginal bleeding, odor, or any concerns  Continue vaginal estrogen and/or trimo-dumont jelly as previously prescribed.    Continue vaginal estrogen and/or trimo-dumont jelly as previously prescribed.    Follow Up:  Return in about 3 months (around 5/22/2024) for Pessary maintenance .    I spent 2 minutes on the separately reported service of pessary maintenance. This time is not included in the time used to support the E/M service also reported today.      Kati Gallardo DO  02/22/2024    [facility] [place] [facility]  [address] [place] [address]  Dept: [phone]  Dept Fax: [phone]  Loc: [phone]  Loc Fax: [phone]     "

## 2024-03-01 ENCOUNTER — OFFICE VISIT (OUTPATIENT)
Dept: ORTHOPEDIC SURGERY | Facility: CLINIC | Age: 59
End: 2024-03-01
Payer: COMMERCIAL

## 2024-03-01 VITALS
WEIGHT: 186 LBS | SYSTOLIC BLOOD PRESSURE: 138 MMHG | HEIGHT: 62 IN | OXYGEN SATURATION: 95 % | DIASTOLIC BLOOD PRESSURE: 78 MMHG | BODY MASS INDEX: 34.23 KG/M2 | HEART RATE: 68 BPM

## 2024-03-01 DIAGNOSIS — M25.561 RIGHT KNEE PAIN, UNSPECIFIED CHRONICITY: Primary | ICD-10-CM

## 2024-03-01 DIAGNOSIS — M25.562 LEFT KNEE PAIN, UNSPECIFIED CHRONICITY: ICD-10-CM

## 2024-03-01 DIAGNOSIS — M17.0 OSTEOARTHRITIS OF BOTH KNEES, UNSPECIFIED OSTEOARTHRITIS TYPE: ICD-10-CM

## 2024-03-01 RX ORDER — TRIAMCINOLONE ACETONIDE 40 MG/ML
40 INJECTION, SUSPENSION INTRA-ARTICULAR; INTRAMUSCULAR
Status: COMPLETED | OUTPATIENT
Start: 2024-03-01 | End: 2024-03-01

## 2024-03-01 RX ORDER — LIDOCAINE HYDROCHLORIDE 10 MG/ML
5 INJECTION, SOLUTION INFILTRATION; PERINEURAL
Status: COMPLETED | OUTPATIENT
Start: 2024-03-01 | End: 2024-03-01

## 2024-03-01 RX ADMIN — LIDOCAINE HYDROCHLORIDE 5 ML: 10 INJECTION, SOLUTION INFILTRATION; PERINEURAL at 13:48

## 2024-03-01 RX ADMIN — TRIAMCINOLONE ACETONIDE 40 MG: 40 INJECTION, SUSPENSION INTRA-ARTICULAR; INTRAMUSCULAR at 13:48

## 2024-03-01 NOTE — PROGRESS NOTES
"Chief Complaint  Follow-up of the Right Knee and Follow-up of the Left Knee     Subjective      Dimple Castellanos presents to [facility] for follow up of the bilateral knees.  She has sharp pains in the thigh on the right knee.  She has pain on the medial aspect of the knee.  She has had pain for about 4 weeks. She has pain with prolonged standing, ambulation and stairs.  She has had no recent injury or fall.     Allergies   Allergen Reactions    Beef Allergy GI Intolerance    Milk-Related Compounds Unknown - Low Severity    Pork-Derived Products GI Intolerance    Sulfa Antibiotics Unknown - Low Severity        Social History     Socioeconomic History    Marital status: Single   Tobacco Use    Smoking status: Former     Packs/day: 2.00     Years: 15.00     Additional pack years: 0.00     Total pack years: 30.00     Types: Cigarettes     Passive exposure: Never    Smokeless tobacco: Never   Vaping Use    Vaping Use: Never used   Substance and Sexual Activity    Alcohol use: Never    Drug use: Never    Sexual activity: Not Currently     Partners: Male     Birth control/protection: Post-menopausal, Same-sex partner        I reviewed the patient's chief complaint, history of present illness, review of systems, past medical history, surgical history, family history, social history, medications, and allergy list.     Review of Systems     Constitutional: Denies fevers, chills, weight loss  Cardiovascular: Denies chest pain, shortness of breath  Skin: Denies rashes, acute skin changes  Neurologic: Denies headache, loss of consciousness        Vital Signs:   /78   Pulse 68   Ht 157.5 cm (62\")   Wt 84.4 kg (186 lb)   SpO2 95%   BMI 34.02 kg/m²          Physical Exam  General: Alert. No acute distress    Ortho Exam        BILATERAL KNEES Flexion 110. Extension 0. Stable to varus/valgus stress. Stable to anterior/posterior drawer. Neurovascularly intact. Negative Tyra. Negative " Lachman. Positive EHL, FHL, HS and TA. Sensation intact to light touch all 5 nerves of the foot. Ambulates with Antalgic gait. Patella is well tracking. Calf supple, non-tender. Positive tenderness to the medial joint line. Positive tenderness to the lateral joint line. Positive Crepitus. Good strength to hamstrings, quadriceps, dorsiflexors, and plantar flexors.  Knee Extensor Mechanism intact    Large Joint Arthrocentesis: R knee  Date/Time: 3/1/2024 1:48 PM  Consent given by: patient  Site marked: site marked  Timeout: Immediately prior to procedure a time out was called to verify the correct patient, procedure, equipment, support staff and site/side marked as required   Supporting Documentation  Indications: pain   Procedure Details  Location: knee - R knee  Needle size: 22 G  Medications administered: 5 mL lidocaine 1 %; 40 mg triamcinolone acetonide 40 MG/ML  Patient tolerance: patient tolerated the procedure well with no immediate complications      Large Joint Arthrocentesis: L knee  Date/Time: 3/1/2024 1:48 PM  Consent given by: patient  Site marked: site marked  Timeout: Immediately prior to procedure a time out was called to verify the correct patient, procedure, equipment, support staff and site/side marked as required   Supporting Documentation  Indications: pain   Procedure Details  Location: knee - L knee  Needle size: 22 G  Medications administered: 5 mL lidocaine 1 %; 40 mg triamcinolone acetonide 40 MG/ML  Patient tolerance: patient tolerated the procedure well with no immediate complications              Imaging Results (Most Recent)       Procedure Component Value Units Date/Time    XR Knee 3 View Right [[id number]] Resulted: 03/01/24 1320     Updated: 03/01/24 1321             Result Review :     X-Ray Report:  Right knee X-Ray  Indication: Evaluation of the right knee  AP/Lateral and [place] view(s)  Findings: Moderate to severe arthritis with bone spurring noted.   Prior studies available for  comparison: no        Assessment and Plan     Diagnoses and all orders for this visit:    1. Right knee pain, unspecified chronicity (Primary)  -     XR Knee 3 View Right    2. Left knee pain, unspecified chronicity    3. Osteoarthritis of both knees, unspecified osteoarthritis type        Discussed the treatment plan with the patient. I reviewed the X-rays that were obtained today with the patient.     Discussed the risks and benefits of conservative measures. The patient expressed understanding and wished to proceed with a bilateral knee steroid injections.  She tolerated the injections well.        Call or return if worsening symptoms.    Follow Up     PRN      Patient was given instructions and counseling regarding her condition or for health maintenance advice. Please see specific information pulled into the AVS if appropriate.     Scribed for Pawel Zavala MD by Hayley Machuca MA.  03/01/24   13:18 EST    I have personally performed the services described in this document as scribed by the above individual and it is both accurate and complete. Pawel Zavala MD 03/01/24

## 2024-05-31 ENCOUNTER — TELEPHONE (OUTPATIENT)
Dept: OBSTETRICS AND GYNECOLOGY | Facility: CLINIC | Age: 59
End: 2024-05-31

## 2024-05-31 NOTE — TELEPHONE ENCOUNTER
Caller: Dimple Castellanos    Relationship to patient: Self    Best call back number: [phone]    Patient is needing: PATIENT CALLED AND STATED THAT HER GPS SAYS SHE WILL BE ARRIVING FOR HER 10:15 APPT AT 10:37 AND WOULD LIKE TO KNOW IF SHE CAN STILL BE SEEN TODAY    HUB UNABLE TO WARM TRANSFER CALL TO PRACTICE

## 2024-05-31 NOTE — TELEPHONE ENCOUNTER
Hub staff attempted to follow warm transfer process and was unsuccessful     Caller: Dimple Castellanos    Relationship to patient: Self    Best call back number: [phone]    Patient is needing: PT CALLED AT START OF APPT TIME 10:15AM. STATING SHE IS IN TRAFFIC. ETA 10:41. PT WOULD LIKE TO RESCHEDULE. NEXT AVAILABLE FOR PROVIDER IN JULY. PT WOULD LIKE TO COME IN SOONER.

## 2024-06-21 ENCOUNTER — OFFICE VISIT (OUTPATIENT)
Dept: OBSTETRICS AND GYNECOLOGY | Facility: CLINIC | Age: 59
End: 2024-06-21
Payer: COMMERCIAL

## 2024-06-21 VITALS
HEART RATE: 53 BPM | SYSTOLIC BLOOD PRESSURE: 115 MMHG | DIASTOLIC BLOOD PRESSURE: 77 MMHG | BODY MASS INDEX: 32.39 KG/M2 | WEIGHT: 176 LBS | HEIGHT: 62 IN

## 2024-06-21 DIAGNOSIS — Z46.89 PESSARY MAINTENANCE: Primary | ICD-10-CM

## 2024-06-21 RX ORDER — LISINOPRIL 5 MG/1
TABLET ORAL
COMMUNITY
Start: 2024-06-18

## 2024-06-21 RX ORDER — UREA 10 %
1 LOTION (ML) TOPICAL EVERY EVENING
COMMUNITY
Start: 2024-05-20

## 2024-06-21 RX ORDER — AZELASTINE HYDROCHLORIDE 137 UG/1
SPRAY, METERED NASAL
COMMUNITY
Start: 2024-04-15

## 2024-06-21 RX ORDER — EPINEPHRINE 0.3 MG/.3ML
INJECTION SUBCUTANEOUS
COMMUNITY
Start: 2024-04-15

## 2024-06-21 RX ORDER — LEVOTHYROXINE SODIUM 125 MCG
TABLET ORAL
COMMUNITY
Start: 2024-06-11

## 2024-06-21 RX ORDER — TRIAMCINOLONE ACETONIDE 55 UG/1
2 SPRAY, METERED NASAL DAILY
COMMUNITY
Start: 2024-04-15

## 2024-06-21 NOTE — PROGRESS NOTES
"Pessary Follow Up Visit        Chief Complaint   Patient presents with    Pessary maintenance     Pessary type and size: #3 ring pessary   Local lidocaine jelly placed:  no    HPI:  Pain:  no  Vaginal bleeding:  no  Problems voiding: no  Vaginal discharge/odor:  yes  Any concerns: no    PHYSICAL EXAM:  /77   Pulse 53   Ht 157.5 cm (62\")   Wt 79.8 kg (176 lb)   BMI 32.19 kg/m²   External genitalia- Normal, no lesions  Pessary- removed, cleaned and replaced  Patient tolerated the procedure well.  Vagina- Normal, no discharge, no bleeding  Bladder- Normal, no masses, non tender  Cvx- Normal, no lesions, no discharge, No cervical motion tenderness    ASSESSMENT AND PLAN:   Diagnoses and all orders for this visit:    1. Pessary maintenance (Primary)        Counseling:  FU for inability to void, pain, vaginal bleeding, odor, or any concerns  Continue vaginal estrogen and/or trimo-dumont jelly as previously prescribed.    Continue vaginal estrogen and/or trimo-dumont jelly as previously prescribed.    Follow Up:  Return in about 3 months (around 9/21/2024) for pessary maintenance .    I spent 2 minutes on the separately reported service of pessary maintenance. This time is not included in the time used to support the E/M service also reported today.      Kati Gallardo,   06/21/2024    [facility] [place] [facility]  [address] [place] [address]  Dept: [phone]  Dept Fax: [phone]  Loc: [phone]  Loc Fax: [phone]     "

## 2024-06-24 ENCOUNTER — TELEPHONE (OUTPATIENT)
Dept: OBSTETRICS AND GYNECOLOGY | Facility: CLINIC | Age: 59
End: 2024-06-24
Payer: COMMERCIAL

## 2024-07-11 DIAGNOSIS — Z46.89 ENCOUNTER FOR FITTING AND ADJUSTMENT OF PESSARY: ICD-10-CM

## 2024-07-12 NOTE — TELEPHONE ENCOUNTER
The pt is needing her Rx renewed for Trimo-D[place] gel 0.025-0.01%.  She was last seen 06/21/24 and has a follow up for her pessary scheduled 09/17/24.  The last refill was picked up on 06/11/24.  The pt said she has another box left but didn't want to wait to long to request it.  Please advise.  Thanks.

## 2024-07-16 RX ORDER — OXYQUINOLINE SULFATE AND SODIUM LAURYL SULFATE .25; .1 MG/G; MG/G
JELLY VAGINAL
Qty: 113 G | Refills: 1 | Status: SHIPPED | OUTPATIENT
Start: 2024-07-16

## 2024-09-05 DIAGNOSIS — Z46.89 ENCOUNTER FOR FITTING AND ADJUSTMENT OF PESSARY: ICD-10-CM

## 2024-09-05 RX ORDER — OXYQUINOLINE SULFATE AND SODIUM LAURYL SULFATE .25; .1 MG/G; MG/G
JELLY VAGINAL
Qty: 113 G | Refills: 1 | Status: SHIPPED | OUTPATIENT
Start: 2024-09-05

## 2024-09-05 NOTE — TELEPHONE ENCOUNTER
The pt needs refills on the [facility] for her pessary.  She was last seen on 24 and is following up on 24.  The Rx was last sent on 24 and has .  Please advise.  Thanks.

## 2024-10-01 ENCOUNTER — OFFICE VISIT (OUTPATIENT)
Dept: OBSTETRICS AND GYNECOLOGY | Facility: CLINIC | Age: 59
End: 2024-10-01
Payer: COMMERCIAL

## 2024-10-01 VITALS — HEIGHT: 62 IN | BODY MASS INDEX: 30.73 KG/M2 | WEIGHT: 167 LBS

## 2024-10-01 DIAGNOSIS — N89.8 VAGINAL DISCHARGE: ICD-10-CM

## 2024-10-01 DIAGNOSIS — Z46.89 PESSARY MAINTENANCE: Primary | ICD-10-CM

## 2024-10-01 PROCEDURE — 99213 OFFICE O/P EST LOW 20 MIN: CPT | Performed by: OBSTETRICS & GYNECOLOGY

## 2024-10-01 PROCEDURE — 87480 CANDIDA DNA DIR PROBE: CPT | Performed by: OBSTETRICS & GYNECOLOGY

## 2024-10-01 PROCEDURE — 87660 TRICHOMONAS VAGIN DIR PROBE: CPT | Performed by: OBSTETRICS & GYNECOLOGY

## 2024-10-01 PROCEDURE — 87510 GARDNER VAG DNA DIR PROBE: CPT | Performed by: OBSTETRICS & GYNECOLOGY

## 2024-10-01 RX ORDER — LEVOTHYROXINE SODIUM 100 MCG
100 TABLET ORAL
COMMUNITY
Start: 2024-09-19

## 2024-10-01 NOTE — PROGRESS NOTES
"Pessary Follow Up Visit        Chief Complaint   Patient presents with    Pessary Check     Pessary type and size: #3 ring pessary   Local lidocaine jelly placed:  no    HPI:  Pain:  no  Vaginal bleeding:  no  Problems voiding: no  Vaginal discharge/odor:  yes  Any concerns: no    PHYSICAL EXAM:  Ht 157.5 cm (62\")   Wt 75.8 kg (167 lb)   BMI 30.54 kg/m²   External genitalia- Normal, no lesions  Pessary- removed, cleaned and replaced  Patient tolerated the procedure well.  Vagina- Normal, no discharge, no bleeding  Bladder- Normal, no masses, non tender  Cvx- Normal, no lesions, no discharge, No cervical motion tenderness    ASSESSMENT AND PLAN:   Diagnoses and all orders for this visit:    1. Pessary maintenance (Primary)        Counseling:  FU for inability to void, pain, vaginal bleeding, odor, or any concerns  Continue vaginal estrogen and/or trimo-dumont jelly as previously prescribed.    Continue vaginal estrogen and/or trimo-dumont jelly as previously prescribed.    Follow Up:  Return in about 3 months (around 1/1/2025) for pessary maintenance.    I spent 2 minutes on the separately reported service of pessary maintenance. This time is not included in the time used to support the E/M service also reported today.      Kati Gallardo DO  10/01/2024    [facility] [place] [place] [facility]  [address] [place] [address]  Dept: [phone]  Dept Fax: [phone]  Loc: [phone]  Loc Fax: [phone]     "

## 2024-11-04 DIAGNOSIS — Z46.89 ENCOUNTER FOR FITTING AND ADJUSTMENT OF PESSARY: ICD-10-CM

## 2024-11-04 RX ORDER — OXYQUINOLINE SULFATE AND SODIUM LAURYL SULFATE .25; .1 MG/G; MG/G
JELLY VAGINAL
Qty: 113 G | Refills: 1 | Status: SHIPPED | OUTPATIENT
Start: 2024-11-04

## 2024-11-22 ENCOUNTER — TELEPHONE (OUTPATIENT)
Dept: OBSTETRICS AND GYNECOLOGY | Facility: CLINIC | Age: 59
End: 2024-11-22
Payer: COMMERCIAL

## 2024-11-22 NOTE — TELEPHONE ENCOUNTER
Spoke with pt regarding upcoming appt with Dr. Gallardo on 1/3/2025. Dr. Gallardo will not be in office that day, so pt needs to reschedule. Pt stated that she will call back when she knows a day she will be available to come on.

## 2025-01-09 ENCOUNTER — OFFICE VISIT (OUTPATIENT)
Dept: OBSTETRICS AND GYNECOLOGY | Facility: CLINIC | Age: 60
End: 2025-01-09
Payer: COMMERCIAL

## 2025-01-09 VITALS — BODY MASS INDEX: 31.47 KG/M2 | HEIGHT: 62 IN | WEIGHT: 171 LBS

## 2025-01-09 DIAGNOSIS — Z46.89 PESSARY MAINTENANCE: Primary | ICD-10-CM

## 2025-01-09 DIAGNOSIS — N81.4 CYSTOCELE WITH PROLAPSE: ICD-10-CM

## 2025-01-09 RX ORDER — QUETIAPINE FUMARATE 100 MG/1
100 TABLET, FILM COATED ORAL
COMMUNITY
Start: 2024-12-26

## 2025-01-09 NOTE — PROGRESS NOTES
"Pessary Follow Up Visit        Chief Complaint   Patient presents with    Pessary Maintenance     Pessary type and size: #3 ring pessary   Local lidocaine jelly placed:  no    HPI:  Pain:  no  Vaginal bleeding:  no  Problems voiding: no  Vaginal discharge/odor:  yes  Any concerns: no    PHYSICAL EXAM:  Ht 157.5 cm (62\")   Wt 77.6 kg (171 lb)   BMI 31.28 kg/m²   External genitalia- Normal, no lesions  Pessary- removed, cleaned and replaced  Patient tolerated the procedure well.  Vagina- Normal, no discharge, no bleeding  Bladder- Normal, no masses, non tender  Cvx- Normal, no lesions, no discharge, No cervical motion tenderness    ASSESSMENT AND PLAN:   Diagnoses and all orders for this visit:    1. Pessary maintenance (Primary)    2. Cystocele with prolapse        Counseling:  FU for inability to void, pain, vaginal bleeding, odor, or any concerns  Continue vaginal estrogen and/or trimo-dumont jelly as previously prescribed.    Continue vaginal estrogen and/or trimo-dumont jelly as previously prescribed.    Follow Up:  Return in about 3 months (around 4/9/2025) for pessary maintenance .    I spent 2 minutes on the separately reported service of pessary maintenance. This time is not included in the time used to support the E/M service also reported today.      Kati Gallardo DO  01/09/2025    [facility]  [address] [place] [address]  Dept: [phone]  Dept Fax: [phone]  Loc: [phone]  Loc Fax: [phone]     "

## 2025-01-22 DIAGNOSIS — Z46.89 ENCOUNTER FOR FITTING AND ADJUSTMENT OF PESSARY: ICD-10-CM

## 2025-01-22 RX ORDER — OXYQUINOLINE SULFATE AND SODIUM LAURYL SULFATE .25; .1 MG/G; MG/G
JELLY VAGINAL
Qty: 113 G | Refills: 1 | Status: SHIPPED | OUTPATIENT
Start: 2025-01-22

## 2025-01-22 NOTE — TELEPHONE ENCOUNTER
Pharmacy requesting refill on Trimo Dover Jelly.  Last seen 1/9/25.  Last filled 11/4/24 Trimo Dover Jelly # [id number] with 1 refill.  Next appointment 4/10/25

## 2025-04-14 ENCOUNTER — PATIENT MESSAGE (OUTPATIENT)
Dept: OBSTETRICS AND GYNECOLOGY | Age: 60
End: 2025-04-14
Payer: COMMERCIAL

## 2025-04-24 ENCOUNTER — OFFICE VISIT (OUTPATIENT)
Dept: OBSTETRICS AND GYNECOLOGY | Age: 60
End: 2025-04-24
Payer: COMMERCIAL

## 2025-04-24 VITALS
WEIGHT: 170 LBS | HEIGHT: 62 IN | BODY MASS INDEX: 31.28 KG/M2 | DIASTOLIC BLOOD PRESSURE: 76 MMHG | SYSTOLIC BLOOD PRESSURE: 116 MMHG | HEART RATE: 77 BPM

## 2025-04-24 DIAGNOSIS — N81.4 CYSTOCELE WITH PROLAPSE: ICD-10-CM

## 2025-04-24 DIAGNOSIS — Z46.89 PESSARY MAINTENANCE: Primary | ICD-10-CM

## 2025-04-24 RX ORDER — LEVOCETIRIZINE DIHYDROCHLORIDE 5 MG/1
TABLET, FILM COATED ORAL
COMMUNITY
Start: 2025-04-17

## 2025-04-24 NOTE — PROGRESS NOTES
"Pessary Follow Up Visit        Chief Complaint   Patient presents with    Pessary Check     Pessary type and size: #3 ring pessary   Local lidocaine jelly placed:  no    HPI:  Pain:  no  Vaginal bleeding:  no  Problems voiding: no  Vaginal discharge/odor:  yes  Any concerns: no    PHYSICAL EXAM:  /76   Pulse 77   Ht 157.5 cm (62\")   Wt 77.1 kg (170 lb)   Breastfeeding No   BMI 31.09 kg/m²   External genitalia- Normal, no lesions  Pessary- removed, cleaned and replaced  Patient tolerated the procedure well.  Vagina- Normal, no discharge, no bleeding  Bladder- Normal, no masses, non tender  Cvx- Normal, no lesions, no discharge, No cervical motion tenderness    ASSESSMENT AND PLAN:   Diagnoses and all orders for this visit:    1. Pessary maintenance (Primary)    2. Cystocele with prolapse        Counseling:  FU for inability to void, pain, vaginal bleeding, odor, or any concerns  Continue vaginal estrogen and/or trimo-dumont jelly as previously prescribed.    Continue vaginal estrogen and/or trimo-dumont jelly as previously prescribed.    Follow Up:  Return in about 3 months (around 7/24/2025).    I spent 2 minutes on the separately reported service of pessary maintenance. This time is not included in the time used to support the E/M service also reported today.      Kati Gallardo DO  04/24/2025    [facility]  [address] [place] [address]  Dept: [phone]  Dept Fax: [phone]  Loc: [phone]  Loc Fax: [phone]     "

## 2025-07-23 ENCOUNTER — PATIENT MESSAGE (OUTPATIENT)
Dept: OBSTETRICS AND GYNECOLOGY | Age: 60
End: 2025-07-23
Payer: COMMERCIAL

## 2025-07-23 ENCOUNTER — OFFICE VISIT (OUTPATIENT)
Dept: OBSTETRICS AND GYNECOLOGY | Age: 60
End: 2025-07-23
Payer: COMMERCIAL

## 2025-07-23 VITALS
HEART RATE: 70 BPM | DIASTOLIC BLOOD PRESSURE: 75 MMHG | BODY MASS INDEX: 31.47 KG/M2 | WEIGHT: 171 LBS | SYSTOLIC BLOOD PRESSURE: 106 MMHG | HEIGHT: 62 IN

## 2025-07-23 DIAGNOSIS — Z46.89 PESSARY MAINTENANCE: Primary | ICD-10-CM

## 2025-07-23 RX ORDER — ESTRADIOL 0.1 MG/G
2 CREAM VAGINAL DAILY
Qty: 42 G | Refills: 1 | Status: SHIPPED | OUTPATIENT
Start: 2025-07-23

## 2025-07-23 RX ORDER — CLOTRIMAZOLE AND BETAMETHASONE DIPROPIONATE 10; .64 MG/G; MG/G
CREAM TOPICAL
COMMUNITY
Start: 2025-06-02

## 2025-07-23 RX ORDER — ALBUTEROL SULFATE 90 UG/1
AEROSOL, METERED RESPIRATORY (INHALATION)
COMMUNITY
Start: 2025-05-14

## 2025-07-23 NOTE — PROGRESS NOTES
"Pessary Follow Up Visit        Chief Complaint   Patient presents with    Pessary maintenance     Pessary type and size: #3 ring pessary   Local lidocaine jelly placed:  no    HPI:  Pain:  no  Vaginal bleeding:  no  Problems voiding: no  Vaginal discharge/odor:  yes  Any concerns: no    PHYSICAL EXAM:  /75   Pulse 70   Ht 157.5 cm (62\")   Wt 77.6 kg (171 lb)   BMI 31.28 kg/m²   External genitalia- Normal, no lesions  Pessary- removed, cleaned and replaced  Patient tolerated the procedure well.  Vagina- Normal, no discharge, no bleeding, small tear at hymenal opening.   Bladder- Normal, no masses, non tender  Cvx- Normal, no lesions, no discharge, No cervical motion tenderness    ASSESSMENT AND PLAN:   Diagnoses and all orders for this visit:    1. Pessary maintenance (Primary)  -     estradiol (ESTRACE VAGINAL) 0.1 MG/GM vaginal cream; Insert 2 g into the vagina Daily. Daily fro two weeks then PRN  Dispense: 42 g; Refill: 1        Counseling:  FU for inability to void, pain, vaginal bleeding, odor, or any concerns  Continue vaginal estrogen and/or trimo-dumont jelly as previously prescribed.    Continue vaginal estrogen and/or trimo-dumont jelly as previously prescribed.    Follow Up:  Return in about 3 months (around 10/23/2025) for pessary maintenance .    I spent 2 minutes on the separately reported service of pessary maintenance. This time is not included in the time used to support the E/M service also reported today.      Kati Gallardo DO  07/23/2025    [facility]  [facility] [place] [address]  Dept: [phone]  Dept Fax: [phone]  Loc: [phone]  Loc Fax: [phone]     "